# Patient Record
(demographics unavailable — no encounter records)

---

## 2025-02-28 NOTE — PLAN
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

## 2025-02-28 NOTE — HEALTH RISK ASSESSMENT
[0] : 2) Feeling down, depressed, or hopeless: Not at all (0) [PHQ-2 Negative - No further assessment needed] : PHQ-2 Negative - No further assessment needed [[id number]] : 0

## 2025-02-28 NOTE — HEALTH RISK ASSESSMENT
[0] : 2) Feeling down, depressed, or hopeless: Not at all (0) [PHQ-2 Negative - No further assessment needed] : PHQ-2 Negative - No further assessment needed [[id number]] : 0

## 2025-06-11 NOTE — HISTORY OF PRESENT ILLNESS
[FreeTextEntry1] : 29-year-old male comes in because of right foot pain caused by kickboxing he noticed it March 21 that he was having pain  he states the first few weeks were bad standing t 8/10 on the pain scale sharp and pulling pain. it hurts more in the morning but after doing physical activities it aches now, he states the pain is 2/10 but can get to 5/10 and its a dull ache pain he doesn't take anything to manage the pain no further concerns

## 2025-06-11 NOTE — ASSESSMENT
[FreeTextEntry1] : Discussed diagnosis and treatment with patient  Discussed etiology of symptoms patient is experiencing  Obtained and reviewed Right ankle xrays 3 views WB: negative for any fracture dislocation Discussed all non-surgical and surgical treatment option Discussed RICE with patient to decrease swelling and for pain control  Rx: Meloxicam PO BID for 2 weeks, then as needed  Recommended OTC ankle brace for stabilizationIn 4 weeks if symptoms persist will discuss physical therapy and or further imaging Discussed prevention measures: orthosis/bracing, evertor muscle (peroneals) strengthening, proprioception exercises. Patient will return to the office in 3-4 weeks

## 2025-06-11 NOTE — PHYSICAL EXAM
[General Appearance - Alert] : alert [General Appearance - In No Acute Distress] : in no acute distress [2+] : left foot dorsalis pedis 2+ [No Joint Swelling] : no joint swelling [Sensation] : the sensory exam was normal to light touch and pinprick [Oriented To Time, Place, And Person] : oriented to person, place, and time [Impaired Insight] : insight and judgment were intact [Varicose Veins Of Lower Extremities] : not present [Ankle Swelling (On Exam)] : not present [] : not present [Vibration Dec.] : normal vibratory sensation at the level of the toes [de-identified] : + tenderness to palpation mild along CFL No tenderness at distal fibula ATFL Maximal point of tenderness peroneal tendon insertion at 5th metatarsal base. There is no erythema or edema  MSK strength 5/5 There is pain on resisted inversion

## 2025-07-09 NOTE — PHYSICAL EXAM
[General Appearance - Alert] : alert [General Appearance - In No Acute Distress] : in no acute distress [Ankle Swelling (On Exam)] : not present [Varicose Veins Of Lower Extremities] : not present [2+] : left foot dorsalis pedis 2+ [No Joint Swelling] : no joint swelling [] : normal strength/tone [de-identified] : + tenderness to palpation mild along CFL No tenderness at distal fibula ATFL Maximal point of tenderness peroneal tendon insertion at 5th metatarsal base. There is no erythema or edema  MSK strength 5/5 There is pain on resisted inversion  Interval improvement [Sensation] : the sensory exam was normal to light touch and pinprick [Vibration Dec.] : normal vibratory sensation at the level of the toes [Oriented To Time, Place, And Person] : oriented to person, place, and time [Impaired Insight] : insight and judgment were intact

## 2025-07-09 NOTE — HISTORY OF PRESENT ILLNESS
[FreeTextEntry1] : 29 year old male comes in for a follow up his right foot peroneal tendonitis ankle sprain he admits >50% improvement. Admits some stiffness.

## 2025-07-09 NOTE — ASSESSMENT
[FreeTextEntry1] : Discussed diagnosis and treatment with patient  Discussed etiology of symptoms patient is experiencing  Obtained and reviewed Right ankle previously xrays 3 views WB: negative for any fracture dislocation Discussed all non-surgical and surgical treatment option Discussed MRI wants to defer Discussed benefits physical therapy wants to defer Discussed injection herapy wants to defer Discussed RICE with patient to decrease swelling and for pain control  Represcribed meloxicam at patient request risk/benefits discussed Discussed prevention measures: orthosis/bracing, evertor muscle (peroneals) strengthening, proprioception exercises. Patient will return to the office in 3-4 weeks